# Patient Record
Sex: FEMALE | Race: OTHER | HISPANIC OR LATINO | URBAN - METROPOLITAN AREA
[De-identification: names, ages, dates, MRNs, and addresses within clinical notes are randomized per-mention and may not be internally consistent; named-entity substitution may affect disease eponyms.]

---

## 2022-11-28 ENCOUNTER — INPATIENT (INPATIENT)
Facility: HOSPITAL | Age: 37
LOS: 2 days | Discharge: HOME | End: 2022-12-01
Attending: OBSTETRICS & GYNECOLOGY | Admitting: OBSTETRICS & GYNECOLOGY
Payer: COMMERCIAL

## 2022-11-28 VITALS — HEART RATE: 118 BPM | SYSTOLIC BLOOD PRESSURE: 137 MMHG | DIASTOLIC BLOOD PRESSURE: 80 MMHG

## 2022-11-28 DIAGNOSIS — Z90.49 ACQUIRED ABSENCE OF OTHER SPECIFIED PARTS OF DIGESTIVE TRACT: Chronic | ICD-10-CM

## 2022-11-28 DIAGNOSIS — O26.893 OTHER SPECIFIED PREGNANCY RELATED CONDITIONS, THIRD TRIMESTER: ICD-10-CM

## 2022-11-28 DIAGNOSIS — O47.1 FALSE LABOR AT OR AFTER 37 COMPLETED WEEKS OF GESTATION: ICD-10-CM

## 2022-11-28 LAB
BASOPHILS # BLD AUTO: 0.05 K/UL — SIGNIFICANT CHANGE UP (ref 0–0.2)
BASOPHILS NFR BLD AUTO: 0.4 % — SIGNIFICANT CHANGE UP (ref 0–2)
BLD GP AB SCN SERPL QL: SIGNIFICANT CHANGE UP
COVID-19 SPIKE DOMAIN AB INTERP: POSITIVE
COVID-19 SPIKE DOMAIN ANTIBODY RESULT: >250 U/ML — HIGH
EOSINOPHIL # BLD AUTO: 0.1 K/UL — SIGNIFICANT CHANGE UP (ref 0–0.5)
EOSINOPHIL NFR BLD AUTO: 0.8 % — SIGNIFICANT CHANGE UP (ref 0–6)
HCT VFR BLD CALC: 36 % — SIGNIFICANT CHANGE UP (ref 34.5–45)
HGB BLD-MCNC: 12.3 G/DL — SIGNIFICANT CHANGE UP (ref 11.5–15.5)
HIV 1 & 2 AB SERPL IA.RAPID: SIGNIFICANT CHANGE UP
HIV 1+2 AB+HIV1 P24 AG SERPL QL IA: SIGNIFICANT CHANGE UP
IMM GRANULOCYTES NFR BLD AUTO: 0.5 % — SIGNIFICANT CHANGE UP (ref 0–0.9)
LYMPHOCYTES # BLD AUTO: 0.79 K/UL — LOW (ref 1–3.3)
LYMPHOCYTES # BLD AUTO: 6.5 % — LOW (ref 13–44)
MCHC RBC-ENTMCNC: 31 PG — SIGNIFICANT CHANGE UP (ref 27–34)
MCHC RBC-ENTMCNC: 34.2 GM/DL — SIGNIFICANT CHANGE UP (ref 32–36)
MCV RBC AUTO: 90.7 FL — SIGNIFICANT CHANGE UP (ref 80–100)
MONOCYTES # BLD AUTO: 0.95 K/UL — HIGH (ref 0–0.9)
MONOCYTES NFR BLD AUTO: 7.9 % — SIGNIFICANT CHANGE UP (ref 2–14)
NEUTROPHILS # BLD AUTO: 10.15 K/UL — HIGH (ref 1.8–7.4)
NEUTROPHILS NFR BLD AUTO: 83.9 % — HIGH (ref 43–77)
PLATELET # BLD AUTO: 121 K/UL — LOW (ref 150–400)
RAPID RVP RESULT: DETECTED
RBC # BLD: 3.97 M/UL — SIGNIFICANT CHANGE UP (ref 3.8–5.2)
RBC # FLD: 12.9 % — SIGNIFICANT CHANGE UP (ref 10.3–14.5)
SARS-COV-2 IGG+IGM SERPL QL IA: >250 U/ML — HIGH
SARS-COV-2 IGG+IGM SERPL QL IA: POSITIVE
SARS-COV-2 RNA SPEC QL NAA+PROBE: DETECTED
T PALLIDUM AB TITR SER: NEGATIVE — SIGNIFICANT CHANGE UP
WBC # BLD: 12.1 K/UL — HIGH (ref 3.8–10.5)
WBC # FLD AUTO: 12.1 K/UL — HIGH (ref 3.8–10.5)

## 2022-11-28 PROCEDURE — 88307 TISSUE EXAM BY PATHOLOGIST: CPT | Mod: 26

## 2022-11-28 RX ORDER — AMPICILLIN TRIHYDRATE 250 MG
2 CAPSULE ORAL ONCE
Refills: 0 | Status: COMPLETED | OUTPATIENT
Start: 2022-11-28 | End: 2022-11-28

## 2022-11-28 RX ORDER — SODIUM CHLORIDE 9 MG/ML
1000 INJECTION, SOLUTION INTRAVENOUS
Refills: 0 | Status: DISCONTINUED | OUTPATIENT
Start: 2022-11-28 | End: 2022-11-29

## 2022-11-28 RX ORDER — GENTAMICIN SULFATE 40 MG/ML
300 VIAL (ML) INJECTION ONCE
Refills: 0 | Status: COMPLETED | OUTPATIENT
Start: 2022-11-28 | End: 2022-11-28

## 2022-11-28 RX ORDER — CHLORHEXIDINE GLUCONATE 213 G/1000ML
1 SOLUTION TOPICAL ONCE
Refills: 0 | Status: DISCONTINUED | OUTPATIENT
Start: 2022-11-28 | End: 2022-11-29

## 2022-11-28 RX ORDER — SODIUM CHLORIDE 9 MG/ML
1000 INJECTION, SOLUTION INTRAVENOUS ONCE
Refills: 0 | Status: DISCONTINUED | OUTPATIENT
Start: 2022-11-28 | End: 2022-11-29

## 2022-11-28 RX ORDER — AMPICILLIN TRIHYDRATE 250 MG
CAPSULE ORAL
Refills: 0 | Status: DISCONTINUED | OUTPATIENT
Start: 2022-11-28 | End: 2022-11-29

## 2022-11-28 RX ORDER — OXYTOCIN 10 UNIT/ML
333.33 VIAL (ML) INJECTION
Qty: 20 | Refills: 0 | Status: DISCONTINUED | OUTPATIENT
Start: 2022-11-28 | End: 2022-12-01

## 2022-11-28 RX ORDER — AMPICILLIN TRIHYDRATE 250 MG
2 CAPSULE ORAL EVERY 6 HOURS
Refills: 0 | Status: DISCONTINUED | OUTPATIENT
Start: 2022-11-29 | End: 2022-11-29

## 2022-11-28 RX ORDER — CITRIC ACID/SODIUM CITRATE 300-500 MG
30 SOLUTION, ORAL ORAL ONCE
Refills: 0 | Status: DISCONTINUED | OUTPATIENT
Start: 2022-11-28 | End: 2022-11-29

## 2022-11-28 RX ORDER — ACETAMINOPHEN 500 MG
1000 TABLET ORAL ONCE
Refills: 0 | Status: COMPLETED | OUTPATIENT
Start: 2022-11-28 | End: 2022-11-28

## 2022-11-28 RX ORDER — OXYTOCIN 10 UNIT/ML
2 VIAL (ML) INJECTION
Qty: 30 | Refills: 0 | Status: DISCONTINUED | OUTPATIENT
Start: 2022-11-28 | End: 2022-12-01

## 2022-11-28 RX ORDER — ONDANSETRON 8 MG/1
4 TABLET, FILM COATED ORAL ONCE
Refills: 0 | Status: COMPLETED | OUTPATIENT
Start: 2022-11-28 | End: 2022-11-28

## 2022-11-28 RX ADMIN — SODIUM CHLORIDE 125 MILLILITER(S): 9 INJECTION, SOLUTION INTRAVENOUS at 09:24

## 2022-11-28 RX ADMIN — ONDANSETRON 4 MILLIGRAM(S): 8 TABLET, FILM COATED ORAL at 23:51

## 2022-11-28 RX ADMIN — SODIUM CHLORIDE 125 MILLILITER(S): 9 INJECTION, SOLUTION INTRAVENOUS at 23:13

## 2022-11-28 RX ADMIN — Medication 200 MILLIGRAM(S): at 18:50

## 2022-11-28 RX ADMIN — Medication 400 MILLIGRAM(S): at 17:52

## 2022-11-28 RX ADMIN — Medication 200 GRAM(S): at 18:17

## 2022-11-28 RX ADMIN — Medication 2 MILLIUNIT(S)/MIN: at 10:53

## 2022-11-28 NOTE — OB PROVIDER H&P - ATTENDING COMMENTS
37y  [id number] GA by LMP who presents to L&D for spontaneous ruptured membranes at 0545 with contractions, admitted for augmentation as needed, plan epidural. Consent for care signed through  after questions answered.

## 2022-11-28 NOTE — OB RN DELIVERY SUMMARY - NSSELHIDDEN_OBGYN_ALL_OB_FT
[NS_[email or web address]_ALL_OB_FT:[email or web address]=],[NS_DeliveryAssist1_OBGYN_ALL_OB_FT:[id number]=],[NS_DeliveryRN_OBGYN_ALL_OB_FT:[id number]]

## 2022-11-28 NOTE — OB PROVIDER H&P - NSLASTDATEVISIT_OBGYN_ALL_OB
Known Have Your Spot(S) Been Treated In The Past?: has not been treated Hpi Title: Evaluation of Skin Lesions

## 2022-11-28 NOTE — OB PROVIDER H&P - HISTORY OF PRESENT ILLNESS
37y  [id number] GA by LMP who presents to L&D for spontaneous ruptured membranes at 0545 with contractions q7m since. Patient denies vaginal bleeding. She endorses good fetal movement. Denies fevers, chills, nausea, vomiting, chest pain, SOB, dizziness and headache. No other complaints at this time.   TOM: 22  LMP: 3/8/22  Prenatal course is significant for: limited prenatal care starting in 3rd trimester    POB: SAB   PGYN: -fibroids, -ovarian cysts, denies STD hx, denies abnormal PAPs   PMH: Denies  PSH: D&C , appendectomy  SH: Denies EtOH, tobacco and illicit drug use during this pregnancy; feels safe at home   Meds: PNVs  Allergies: NKDA

## 2022-11-28 NOTE — OB RN PATIENT PROFILE - FALL HARM RISK - UNIVERSAL INTERVENTIONS
Bed in lowest position, wheels locked, appropriate side rails in place/Call bell, personal items and telephone in reach/Instruct patient to call for assistance before getting out of bed or chair/Non-slip footwear when patient is out of bed/[place] to call system/Physically safe environment - no spills, clutter or unnecessary equipment/Purposeful Proactive Rounding/Room/bathroom lighting operational, light cord in reach

## 2022-11-28 NOTE — OB PROVIDER LABOR PROGRESS NOTE - ASSESSMENT
FHT Cat 2, FH improved with maternal repositioning. Pitocin off LR bolus.  C/w amp/gent/ofirmev  Continue expt until patient feels constant pressure

## 2022-11-28 NOTE — CHART NOTE - NSCHARTNOTEFT_GEN_A_CORE
5 minute deceleration s/p epidural. /59. Responded to repositioning. Pitocin off. Fluid bolus being started.

## 2022-11-28 NOTE — OB PROVIDER H&P - ASSESSMENT
37y  [id number] GA by LMP who presents to L&D for spontaneous ruptured membranes at 0545 with contractions q7m since.    -Admit to L&D  -Consent  -Admission labs  -NPO, except ice chips   -IV fluids  -Fetus: Cat I tracing. Continuous toco and fetal monitoring.   -GBS: Negative, no GBS ppx required   -Analgesia: epidural prn  -Pitocin induction    Discussed with Dr. Nixon

## 2022-11-28 NOTE — OB PROVIDER LABOR PROGRESS NOTE - ASSESSMENT
FHT Cat 1  Maternal temp to 101.6 F orally, FH baseline 150-160s. Maternal temp likely secondary to symptomatic COVID. Ofirmev given  Continue pitocin     FHT Cat 1  Maternal temp to 101.6 F orally, FH baseline 150-160s. Amp/gent/ofirmev given  Continue pitocin

## 2022-11-28 NOTE — OB RN DELIVERY SUMMARY - NS_SEPSISRSKCALC_OBGYN_ALL_OB_FT
EOS calculated successfully. EOS Risk Factor: 0.5/1000 live births ([facility] national incidence); GA=38w5d; Temp=101.48; ROM=19.6; GBS='Negative'; Antibiotics='Broad spectrum antibiotics > 4 hrs prior to birth'

## 2022-11-28 NOTE — OB PROVIDER H&P - NSHPPHYSICALEXAM_GEN_ALL_CORE
T(C): 37.1 (11-28-22 @ 07:50), Max: 37.1 (11-28-22 @ 07:26)  HR: 103 (11-28-22 @ 08:53) (100 - 132)  BP: 137/80 (11-28-22 @ 07:50) (137/80 - 137/80)  RR: 16 (11-28-22 @ 07:50) (16 - 16)  SpO2: 100% (11-28-22 @ 08:48) (100% - 100%)    Gen: NAD, well-appearing, AAOx3   Abd: Soft, gravid  Ext: non-tender, non-edematous  SSE: grossly rupture, pooling, clear, nitrazine +   SVE:  2/90/-2  Bedside sono: cephalic, posterior placenta, ALEXA 11  FHT: 155bpm baseline, moderate variability, + accels, no decels  Seaview: q6-8m

## 2022-11-28 NOTE — OB PROVIDER LABOR PROGRESS NOTE - NS_OBIHIFHRDETAILS_OBGYN_ALL_OB_FT
baseline 160s, moderate variability, +accels, -decels
baseline 150, moderate variability, -accels, -decels

## 2022-11-28 NOTE — OB RN PATIENT PROFILE - TERM DELIVERIES, OB PROFILE
[facility]  [address]  [facility] [address]  Phone:  [phone]  Fax:  [phone]                                    Iva Bowman   MRN: [id number]    Department:  [facility]   Date of Visit:  2/5/2020           After Visit Summary Signature Page    I have received my discharge instructions, and my questions have been answered. I have discussed any challenges I see with this plan with the nurse or doctor.    ..........................................................................................................................................  Patient/Patient Representative Signature      ..........................................................................................................................................  Patient Representative Print Name and Relationship to Patient    ..................................................               ................................................  Date                                   Time    ..........................................................................................................................................  Reviewed by Signature/Title    ...................................................              ..............................................  Date                                               Time          22EPIC Rev 08/18       
0

## 2022-11-29 RX ORDER — OXYCODONE HYDROCHLORIDE 5 MG/1
5 TABLET ORAL
Refills: 0 | Status: DISCONTINUED | OUTPATIENT
Start: 2022-11-29 | End: 2022-12-01

## 2022-11-29 RX ORDER — ACETAMINOPHEN 500 MG
975 TABLET ORAL EVERY 6 HOURS
Refills: 0 | Status: COMPLETED | OUTPATIENT
Start: 2022-11-29 | End: 2023-10-28

## 2022-11-29 RX ORDER — IBUPROFEN 200 MG
600 TABLET ORAL EVERY 6 HOURS
Refills: 0 | Status: COMPLETED | OUTPATIENT
Start: 2022-11-29 | End: 2023-10-28

## 2022-11-29 RX ORDER — TETANUS TOXOID, REDUCED DIPHTHERIA TOXOID AND ACELLULAR PERTUSSIS VACCINE, ADSORBED 5; 2.5; 8; 8; 2.5 [IU]/.5ML; [IU]/.5ML; UG/.5ML; UG/.5ML; UG/.5ML
0.5 SUSPENSION INTRAMUSCULAR ONCE
Refills: 0 | Status: DISCONTINUED | OUTPATIENT
Start: 2022-11-29 | End: 2022-12-01

## 2022-11-29 RX ORDER — ACETAMINOPHEN 500 MG
975 TABLET ORAL EVERY 6 HOURS
Refills: 0 | Status: DISCONTINUED | OUTPATIENT
Start: 2022-11-29 | End: 2022-12-01

## 2022-11-29 RX ORDER — LANOLIN
1 OINTMENT (GRAM) TOPICAL EVERY 6 HOURS
Refills: 0 | Status: DISCONTINUED | OUTPATIENT
Start: 2022-11-29 | End: 2022-12-01

## 2022-11-29 RX ORDER — BENZOCAINE 10 %
1 GEL (GRAM) MUCOUS MEMBRANE EVERY 6 HOURS
Refills: 0 | Status: DISCONTINUED | OUTPATIENT
Start: 2022-11-29 | End: 2022-12-01

## 2022-11-29 RX ORDER — PRAMOXINE HYDROCHLORIDE 150 MG/15G
1 AEROSOL, FOAM RECTAL EVERY 4 HOURS
Refills: 0 | Status: DISCONTINUED | OUTPATIENT
Start: 2022-11-29 | End: 2022-12-01

## 2022-11-29 RX ORDER — SODIUM CHLORIDE 9 MG/ML
3 INJECTION INTRAMUSCULAR; INTRAVENOUS; SUBCUTANEOUS EVERY 8 HOURS
Refills: 0 | Status: DISCONTINUED | OUTPATIENT
Start: 2022-11-29 | End: 2022-12-01

## 2022-11-29 RX ORDER — KETOROLAC TROMETHAMINE 30 MG/ML
30 SYRINGE (ML) INJECTION ONCE
Refills: 0 | Status: DISCONTINUED | OUTPATIENT
Start: 2022-11-29 | End: 2022-11-29

## 2022-11-29 RX ORDER — OXYTOCIN 10 UNIT/ML
41.67 VIAL (ML) INJECTION
Qty: 20 | Refills: 0 | Status: DISCONTINUED | OUTPATIENT
Start: 2022-11-29 | End: 2022-12-01

## 2022-11-29 RX ORDER — IBUPROFEN 200 MG
600 TABLET ORAL EVERY 6 HOURS
Refills: 0 | Status: DISCONTINUED | OUTPATIENT
Start: 2022-11-29 | End: 2022-12-01

## 2022-11-29 RX ORDER — OXYCODONE HYDROCHLORIDE 5 MG/1
5 TABLET ORAL ONCE
Refills: 0 | Status: DISCONTINUED | OUTPATIENT
Start: 2022-11-29 | End: 2022-12-01

## 2022-11-29 RX ORDER — HYDROCORTISONE 1 %
1 OINTMENT (GRAM) TOPICAL EVERY 6 HOURS
Refills: 0 | Status: DISCONTINUED | OUTPATIENT
Start: 2022-11-29 | End: 2022-12-01

## 2022-11-29 RX ORDER — SIMETHICONE 80 MG/1
80 TABLET, CHEWABLE ORAL EVERY 4 HOURS
Refills: 0 | Status: DISCONTINUED | OUTPATIENT
Start: 2022-11-29 | End: 2022-12-01

## 2022-11-29 RX ORDER — DIBUCAINE 1 %
1 OINTMENT (GRAM) RECTAL EVERY 6 HOURS
Refills: 0 | Status: DISCONTINUED | OUTPATIENT
Start: 2022-11-29 | End: 2022-12-01

## 2022-11-29 RX ORDER — ACETAMINOPHEN 500 MG
1000 TABLET ORAL ONCE
Refills: 0 | Status: DISCONTINUED | OUTPATIENT
Start: 2022-11-29 | End: 2022-12-01

## 2022-11-29 RX ORDER — DIPHENHYDRAMINE HCL 50 MG
25 CAPSULE ORAL EVERY 6 HOURS
Refills: 0 | Status: DISCONTINUED | OUTPATIENT
Start: 2022-11-29 | End: 2022-12-01

## 2022-11-29 RX ORDER — MAGNESIUM HYDROXIDE 400 MG/1
30 TABLET, CHEWABLE ORAL
Refills: 0 | Status: DISCONTINUED | OUTPATIENT
Start: 2022-11-29 | End: 2022-12-01

## 2022-11-29 RX ORDER — AER TRAVELER 0.5 G/1
1 SOLUTION RECTAL; TOPICAL EVERY 4 HOURS
Refills: 0 | Status: DISCONTINUED | OUTPATIENT
Start: 2022-11-29 | End: 2022-12-01

## 2022-11-29 RX ADMIN — Medication 200 GRAM(S): at 00:07

## 2022-11-29 RX ADMIN — Medication 30 MILLIGRAM(S): at 02:25

## 2022-11-29 RX ADMIN — Medication 600 MILLIGRAM(S): at 21:05

## 2022-11-29 RX ADMIN — Medication 975 MILLIGRAM(S): at 12:16

## 2022-11-29 RX ADMIN — SODIUM CHLORIDE 3 MILLILITER(S): 9 INJECTION INTRAMUSCULAR; INTRAVENOUS; SUBCUTANEOUS at 05:22

## 2022-11-29 RX ADMIN — Medication 600 MILLIGRAM(S): at 14:44

## 2022-11-29 RX ADMIN — Medication 600 MILLIGRAM(S): at 09:24

## 2022-11-29 RX ADMIN — SODIUM CHLORIDE 3 MILLILITER(S): 9 INJECTION INTRAMUSCULAR; INTRAVENOUS; SUBCUTANEOUS at 14:00

## 2022-11-29 RX ADMIN — Medication 975 MILLIGRAM(S): at 17:08

## 2022-11-29 RX ADMIN — Medication 975 MILLIGRAM(S): at 05:13

## 2022-11-29 RX ADMIN — Medication 1 TABLET(S): at 12:16

## 2022-11-29 NOTE — OB PROVIDER DELIVERY SUMMARY - NSPROVIDERDELIVERYNOTE_OBGYN_ALL_OB_FT
Vaginal Delivery Summary    Procedure: Normal spontaneous vaginal delivery   Findings: Viable female infant delivered in cephalic presentation at 0106, placenta delivered at 0113.  Apgar scores: 9/9.   Weight: 2850  Laceration(s): 1st degree perineal and right labial   Repair: chromic   EBL: 112  Complications: No complications     Procedure:   Patient felt rectal pressure and was found to be fully dilated, +1 station. She pushed effectively for 30 minutes. She delivered a viable female infant. Delayed cord clamping was performed for 30 seconds. Placenta delivered intact and pitocin was started at the delivery of the placenta. Perineum and vagina were inspected, a 1st degree and labial laceration present and repaired. Adequate hemostasis was obtained. Vaginal Delivery Summary    Procedure: Normal spontaneous vaginal delivery   Findings: Viable female infant delivered in cephalic presentation at 0106, placenta delivered at 0113.  Apgar scores: 9/9.   Weight: 2850  Laceration(s): 1st degree perineal and right labial   Repair: 2-0 chromic   EBL: 112  Complications: No complications     Procedure:   Patient felt rectal pressure and was found to be fully dilated, +2 station. She pushed effectively for 30 minutes. She delivered a viable female infant. Delayed cord clamping was performed for 30 seconds. Infant handed to nurse as patient with active COVID. Placenta delivered intact and pitocin was started at the delivery of the placenta. Perineum and vagina were inspected, a 1st degree and labial laceration present and repaired. Adequate hemostasis was obtained.    Attending addendum:   I was present for delivery and agree with assessment and plan.   Brianna Little MD

## 2022-11-29 NOTE — DISCHARGE NOTE OB - MEDICATION SUMMARY - MEDICATIONS TO TAKE
I will START or STAY ON the medications listed below when I get home from the hospital:    acetaminophen 500 mg oral tablet  -- 2 tab(s) by mouth every 6 hours   -- This product contains acetaminophen.  Do not use  with any other product containing acetaminophen to prevent possible liver damage.    -- Indication: For pain    ibuprofen 600 mg oral tablet  -- 1 tab(s) by mouth every 6 hours   -- Do not take this drug if you are pregnant.  It is very important that you take or use this exactly as directed.  Do not skip doses or discontinue unless directed by your doctor.  May cause drowsiness or dizziness.  Obtain medical advice before taking any non-prescription drugs as some may affect the action of this medication.  Take with food or milk.    -- Indication: For pain    Prenatal Multivitamins with Folic Acid 1 mg oral tablet  -- 1 tab(s) by mouth once a day  -- Indication: For postpartum

## 2022-11-29 NOTE — DISCHARGE NOTE OB - PATIENT PORTAL LINK FT
You can access the FollowMyHealth Patient Portal offered by [facility] by registering at the following website: [email or web address]. By joining [facility]’s FollowMyHealth portal, you will also be able to view your health information using other applications (apps) compatible with our system.

## 2022-11-29 NOTE — DISCHARGE NOTE OB - MEDICATION SUMMARY - MEDICATIONS TO CHANGE
I will SWITCH the dose or number of times a day I take the medications listed below when I get home from the hospital:  None Dutasteride Male Counseling: Dustasteride Counseling:  I discussed with the patient the risks of use of dutasteride including but not limited to decreased libido, decreased ejaculate volume, and gynecomastia. Women who can become pregnant should not handle medication.  All of the patient's questions and concerns were addressed. Dutasteride Counseling: Dustasteride Counseling:  I discussed with the patient the risks of use of dutasteride including but not limited to decreased libido, decreased ejaculate volume, and gynecomastia. Women who can become pregnant should not handle medication.  All of the patient's questions and concerns were addressed.

## 2022-11-29 NOTE — DISCHARGE NOTE OB - HOSPITAL COURSE
Normal spontaneous vaginal delivery at 38w5d secondary to labor complicated by elevated temp, COVID vs chorio s/p ampicillin, gentamycin, and ofirmev. Postpartum pain is well controlled with PRN medication. She has no difficulty with ambulation, voiding or PO intake. Lab values and vital signs are within normal limits prior to discharge.

## 2022-11-29 NOTE — OB PROVIDER DELIVERY SUMMARY - NSSELHIDDEN_OBGYN_ALL_OB_FT
[NS_DeliveryAttending1_OBGYN_ALL_OB_FT:[email or web address]=],[NS_DeliveryAssist1_OBGYN_ALL_OB_FT:[id number]=]

## 2022-11-29 NOTE — DISCHARGE NOTE OB - CARE PROVIDER_API CALL
Roni Car)  Obstetrics and Gynecology  [address] [place] [address]  Phone: [phone]  Fax: [phone]  Follow Up Time: 2 weeks

## 2022-11-30 LAB
HCT VFR BLD CALC: 30.2 % — LOW (ref 34.5–45)
HGB BLD-MCNC: 10 G/DL — LOW (ref 11.5–15.5)

## 2022-11-30 RX ORDER — IBUPROFEN 200 MG
1 TABLET ORAL
Qty: 28 | Refills: 0
Start: 2022-11-30 | End: 2022-12-06

## 2022-11-30 RX ORDER — ACETAMINOPHEN 500 MG
2 TABLET ORAL
Qty: 56 | Refills: 0
Start: 2022-11-30 | End: 2022-12-06

## 2022-11-30 RX ADMIN — Medication 975 MILLIGRAM(S): at 17:52

## 2022-11-30 RX ADMIN — Medication 600 MILLIGRAM(S): at 08:35

## 2022-11-30 RX ADMIN — Medication 600 MILLIGRAM(S): at 15:02

## 2022-11-30 RX ADMIN — Medication 600 MILLIGRAM(S): at 22:05

## 2022-11-30 RX ADMIN — Medication 975 MILLIGRAM(S): at 00:32

## 2022-11-30 RX ADMIN — Medication 1 TABLET(S): at 12:30

## 2022-11-30 RX ADMIN — Medication 975 MILLIGRAM(S): at 12:31

## 2022-11-30 RX ADMIN — Medication 975 MILLIGRAM(S): at 05:06

## 2022-11-30 NOTE — PROGRESS NOTE ADULT - ATTENDING COMMENTS
PPD#1  Doing well  COVID + symptomatic  Hgb 12.3 -->10  routine PP care  continue current mgmt  ppalos

## 2022-11-30 NOTE — PROGRESS NOTE ADULT - ASSESSMENT
A/P:  JONNIE MAKI is a 37y  now PPD#1 s/p spontaneous vaginal delivery at 38w5d gestation secondary to labor, complicated by a temperature of 101 likely secondary to COVID vs, chorioamnionitis s/p ampicillin and gentamycin. COVID positive, symptomatic   -Vital signs stable  -Hgb: 12.3 -> AM labs pending   -Voiding, tolerating PO  -Advance care as tolerated   -Continue routine postpartum care and education  -Healthy female infant  -Dispo: Continue with inpatient management and plan for discharge home tomorrow pending attending approval

## 2022-12-01 VITALS
OXYGEN SATURATION: 95 % | HEART RATE: 75 BPM | SYSTOLIC BLOOD PRESSURE: 139 MMHG | TEMPERATURE: 98 F | DIASTOLIC BLOOD PRESSURE: 85 MMHG | RESPIRATION RATE: 18 BRPM

## 2022-12-01 PROCEDURE — 86901 BLOOD TYPING SEROLOGIC RH(D): CPT

## 2022-12-01 PROCEDURE — 59050 FETAL MONITOR W/REPORT: CPT

## 2022-12-01 PROCEDURE — 36415 COLL VENOUS BLD VENIPUNCTURE: CPT

## 2022-12-01 PROCEDURE — G0463: CPT

## 2022-12-01 PROCEDURE — 85025 COMPLETE CBC W/AUTO DIFF WBC: CPT

## 2022-12-01 PROCEDURE — 85018 HEMOGLOBIN: CPT

## 2022-12-01 PROCEDURE — 86769 SARS-COV-2 COVID-19 ANTIBODY: CPT

## 2022-12-01 PROCEDURE — 0225U NFCT DS DNA&RNA 21 SARSCOV2: CPT

## 2022-12-01 PROCEDURE — 88307 TISSUE EXAM BY PATHOLOGIST: CPT

## 2022-12-01 PROCEDURE — 86900 BLOOD TYPING SEROLOGIC ABO: CPT

## 2022-12-01 PROCEDURE — 85014 HEMATOCRIT: CPT

## 2022-12-01 PROCEDURE — 87389 HIV-1 AG W/HIV-1&-2 AB AG IA: CPT

## 2022-12-01 PROCEDURE — 59025 FETAL NON-STRESS TEST: CPT

## 2022-12-01 PROCEDURE — 86780 TREPONEMA PALLIDUM: CPT

## 2022-12-01 PROCEDURE — 86703 HIV-1/HIV-2 1 RESULT ANTBDY: CPT

## 2022-12-01 PROCEDURE — 86850 RBC ANTIBODY SCREEN: CPT

## 2022-12-01 RX ADMIN — Medication 600 MILLIGRAM(S): at 08:47

## 2022-12-01 RX ADMIN — Medication 975 MILLIGRAM(S): at 05:18

## 2022-12-01 NOTE — PROGRESS NOTE ADULT - ATTENDING COMMENTS
37y  now PPD#2  no complaints  COVID+, pt currently asymptomatic  denies VB, denies SOB, cough, dizziness  breastfeeding   pt has been afebrile x 48hrs  Abd soft, uterus firm, NT  ext no edema   plan   DC home today   PP care dicsussed   COVID precautions given   F/up in clinic 6 weeks

## 2022-12-01 NOTE — PROGRESS NOTE ADULT - ASSESSMENT
A/P:  JONNIE MAKI is a 37y  now PPD#1 s/p spontaneous vaginal delivery at 38w5d gestation secondary to labor, complicated by a temperature of 101 likely secondary to COVID vs, chorioamnionitis s/p ampicillin and gentamycin. COVID positive, symptomatic   -Vital signs stable  -Hgb: 12.3 -> 10.0   -Voiding, tolerating PO  -Advance care as tolerated   -Continue routine postpartum care and education  -Healthy female infant  -Dispo: Plan for discharge home today pending attending approval

## 2022-12-01 NOTE — PROGRESS NOTE ADULT - SUBJECTIVE AND OBJECTIVE BOX
INTERVAL HPI/OVERNIGHT EVENTS:  37y Female s/p labor epidural, dural puncture on 11/28/2022    Vital Signs Last 24 Hrs  T(C): 36.6 (29 Nov 2022 08:31), Max: 38.6 (28 Nov 2022 17:19)  T(F): 97.8 (29 Nov 2022 08:31), Max: 101.48 (28 Nov 2022 17:19)  HR: 78 (29 Nov 2022 08:31) (71 - 130)  BP: 130/67 (29 Nov 2022 08:31) (101/56 - 157/82)  BP(mean): --  RR: 16 (29 Nov 2022 08:31) (16 - 20)  SpO2: 100% (29 Nov 2022 08:31) (87% - 100%)    Parameters below as of 29 Nov 2022 08:31  Patient On (Oxygen Delivery Method): room air            Patient's overall anesthesia satisfaction: Positive    Patient seen and doing well     No headache      No residual numbness or weakness, sensory and motor function intact    Site examined Clean and Dry    No anesthetic complications or complaints noted or reported                 
JONNIE MAKI is a 37y  now PPD#2 s/p spontaneous vaginal delivery at 38w5d gestation secondary to labor, complicated by a temperature of 101 likely secondary to COVID vs, chorioamnionitis s/p ampicillin and gentamycin. COVID positive, symptomatic     S:    No acute events overnight. Afebrile overnight   The patient has no complaints.  Pain controlled with current treatment regimen.   She is ambulating without difficulty and tolerating PO.   + flatus/-BM/+ voiding   She endorses appropriate lochia, which is decreasing.   She is breastfeeding without difficulty.   She denies fevers, chills, nausea and vomiting.   She denies lightheadedness, dizziness, palpitations, chest pain and SOB.     O:    Vital Signs Last 24 Hrs  T(C): 36.8 (2022 19:41), Max: 36.9 (2022 15:24)  T(F): 98.2 (2022 19:41), Max: 98.4 (2022 15:24)  HR: 78 (2022 19:41) (70 - 78)  BP: 130/83 (2022 19:41) (118/63 - 130/83)  RR: 18 (2022 19:41) (18 - 18)  SpO2: 98% (2022 19:41) (98% - 98%)    Gen: NAD, AOx3, resting comfortably on room air   Abdomen:  Soft, non-tender, non-distended  Uterus:  Fundus firm below umbilicus  VE:  Expected lochia  Ext:  b/l LE non-tender                                      10.0   x     )-----------( x        ( 2022 06:38 )             30.2             
JONNIE MAKI is a 37y  now PPD#1 s/p spontaneous vaginal delivery at 38w5d gestation secondary to labor, complicated by a temperature of 101 likely secondary to COVID vs, chorioamnionitis s/p ampicillin and gentamycin. COVID positive, symptomatic     S:    No acute events overnight. Afebrile overnight   The patient has no complaints.  Pain controlled with current treatment regimen.   She is ambulating without difficulty and tolerating PO.   + flatus/-BM/+ voiding   She endorses appropriate lochia, which is decreasing.   She is breastfeeding without difficulty.   She denies fevers, chills, nausea and vomiting.   She denies lightheadedness, dizziness, palpitations, chest pain and SOB.     O:    T(C): 36.9 (22 @ 15:30), Max: 36.9 (22 @ 15:30)  HR: 77 (22 @ 15:30) (77 - 93)  BP: 133/76 (22 @ 15:30) (130/67 - 133/80)  RR: 18 (22 @ 15:30) (16 - 18)  SpO2: 100% (22 @ 15:30) (99% - 100%)    Gen: NAD, AOx3, resting comfortably on room air   Abdomen:  Soft, non-tender, non-distended  Uterus:  Fundus firm below umbilicus  VE:  Expected lochia  Ext:  b/l LE non-tender                           12.3   12.10 )-----------( 121      ( 2022 09:25 )             36.0

## 2022-12-13 LAB — SURGICAL PATHOLOGY STUDY: SIGNIFICANT CHANGE UP

## 2023-11-13 ENCOUNTER — EMERGENCY (EMERGENCY)
Facility: HOSPITAL | Age: 38
LOS: 1 days | Discharge: HOME | End: 2023-11-13
Attending: EMERGENCY MEDICINE
Payer: MEDICAID

## 2023-11-13 VITALS
TEMPERATURE: 98 F | OXYGEN SATURATION: 100 % | DIASTOLIC BLOOD PRESSURE: 74 MMHG | SYSTOLIC BLOOD PRESSURE: 116 MMHG | HEART RATE: 95 BPM | RESPIRATION RATE: 18 BRPM

## 2023-11-13 VITALS
HEIGHT: 63 IN | RESPIRATION RATE: 20 BRPM | SYSTOLIC BLOOD PRESSURE: 111 MMHG | TEMPERATURE: 98 F | WEIGHT: 130.95 LBS | HEART RATE: 88 BPM | DIASTOLIC BLOOD PRESSURE: 78 MMHG | OXYGEN SATURATION: 98 %

## 2023-11-13 DIAGNOSIS — Z90.49 ACQUIRED ABSENCE OF OTHER SPECIFIED PARTS OF DIGESTIVE TRACT: Chronic | ICD-10-CM

## 2023-11-13 LAB
ALBUMIN SERPL ELPH-MCNC: 4.3 G/DL — SIGNIFICANT CHANGE UP (ref 3.3–5.2)
ALBUMIN SERPL ELPH-MCNC: 4.3 G/DL — SIGNIFICANT CHANGE UP (ref 3.3–5.2)
ALP SERPL-CCNC: 106 U/L — SIGNIFICANT CHANGE UP (ref 40–120)
ALP SERPL-CCNC: 106 U/L — SIGNIFICANT CHANGE UP (ref 40–120)
ALT FLD-CCNC: 42 U/L — HIGH
ALT FLD-CCNC: 42 U/L — HIGH
ANION GAP SERPL CALC-SCNC: 9 MMOL/L — SIGNIFICANT CHANGE UP (ref 5–17)
ANION GAP SERPL CALC-SCNC: 9 MMOL/L — SIGNIFICANT CHANGE UP (ref 5–17)
AST SERPL-CCNC: 77 U/L — HIGH
AST SERPL-CCNC: 77 U/L — HIGH
BASOPHILS # BLD AUTO: 0.03 K/UL — SIGNIFICANT CHANGE UP (ref 0–0.2)
BASOPHILS # BLD AUTO: 0.03 K/UL — SIGNIFICANT CHANGE UP (ref 0–0.2)
BASOPHILS NFR BLD AUTO: 0.3 % — SIGNIFICANT CHANGE UP (ref 0–2)
BASOPHILS NFR BLD AUTO: 0.3 % — SIGNIFICANT CHANGE UP (ref 0–2)
BILIRUB SERPL-MCNC: 0.3 MG/DL — LOW (ref 0.4–2)
BILIRUB SERPL-MCNC: 0.3 MG/DL — LOW (ref 0.4–2)
BUN SERPL-MCNC: 16.2 MG/DL — SIGNIFICANT CHANGE UP (ref 8–20)
BUN SERPL-MCNC: 16.2 MG/DL — SIGNIFICANT CHANGE UP (ref 8–20)
CALCIUM SERPL-MCNC: 9.1 MG/DL — SIGNIFICANT CHANGE UP (ref 8.4–10.5)
CALCIUM SERPL-MCNC: 9.1 MG/DL — SIGNIFICANT CHANGE UP (ref 8.4–10.5)
CHLORIDE SERPL-SCNC: 101 MMOL/L — SIGNIFICANT CHANGE UP (ref 96–108)
CHLORIDE SERPL-SCNC: 101 MMOL/L — SIGNIFICANT CHANGE UP (ref 96–108)
CO2 SERPL-SCNC: 29 MMOL/L — SIGNIFICANT CHANGE UP (ref 22–29)
CO2 SERPL-SCNC: 29 MMOL/L — SIGNIFICANT CHANGE UP (ref 22–29)
CREAT SERPL-MCNC: 0.64 MG/DL — SIGNIFICANT CHANGE UP (ref 0.5–1.3)
CREAT SERPL-MCNC: 0.64 MG/DL — SIGNIFICANT CHANGE UP (ref 0.5–1.3)
EGFR: 115 ML/MIN/1.73M2 — SIGNIFICANT CHANGE UP
EGFR: 115 ML/MIN/1.73M2 — SIGNIFICANT CHANGE UP
EOSINOPHIL # BLD AUTO: 0.05 K/UL — SIGNIFICANT CHANGE UP (ref 0–0.5)
EOSINOPHIL # BLD AUTO: 0.05 K/UL — SIGNIFICANT CHANGE UP (ref 0–0.5)
EOSINOPHIL NFR BLD AUTO: 0.5 % — SIGNIFICANT CHANGE UP (ref 0–6)
EOSINOPHIL NFR BLD AUTO: 0.5 % — SIGNIFICANT CHANGE UP (ref 0–6)
GLUCOSE SERPL-MCNC: 113 MG/DL — HIGH (ref 70–99)
GLUCOSE SERPL-MCNC: 113 MG/DL — HIGH (ref 70–99)
HCG SERPL-ACNC: <4 MIU/ML — SIGNIFICANT CHANGE UP
HCG SERPL-ACNC: <4 MIU/ML — SIGNIFICANT CHANGE UP
HCT VFR BLD CALC: 39.1 % — SIGNIFICANT CHANGE UP (ref 34.5–45)
HCT VFR BLD CALC: 39.1 % — SIGNIFICANT CHANGE UP (ref 34.5–45)
HGB BLD-MCNC: 13 G/DL — SIGNIFICANT CHANGE UP (ref 11.5–15.5)
HGB BLD-MCNC: 13 G/DL — SIGNIFICANT CHANGE UP (ref 11.5–15.5)
IMM GRANULOCYTES NFR BLD AUTO: 0.3 % — SIGNIFICANT CHANGE UP (ref 0–0.9)
IMM GRANULOCYTES NFR BLD AUTO: 0.3 % — SIGNIFICANT CHANGE UP (ref 0–0.9)
LIDOCAIN IGE QN: 31 U/L — SIGNIFICANT CHANGE UP (ref 22–51)
LIDOCAIN IGE QN: 31 U/L — SIGNIFICANT CHANGE UP (ref 22–51)
LYMPHOCYTES # BLD AUTO: 1.1 K/UL — SIGNIFICANT CHANGE UP (ref 1–3.3)
LYMPHOCYTES # BLD AUTO: 1.1 K/UL — SIGNIFICANT CHANGE UP (ref 1–3.3)
LYMPHOCYTES # BLD AUTO: 10.4 % — LOW (ref 13–44)
LYMPHOCYTES # BLD AUTO: 10.4 % — LOW (ref 13–44)
MCHC RBC-ENTMCNC: 28.8 PG — SIGNIFICANT CHANGE UP (ref 27–34)
MCHC RBC-ENTMCNC: 28.8 PG — SIGNIFICANT CHANGE UP (ref 27–34)
MCHC RBC-ENTMCNC: 33.2 GM/DL — SIGNIFICANT CHANGE UP (ref 32–36)
MCHC RBC-ENTMCNC: 33.2 GM/DL — SIGNIFICANT CHANGE UP (ref 32–36)
MCV RBC AUTO: 86.7 FL — SIGNIFICANT CHANGE UP (ref 80–100)
MCV RBC AUTO: 86.7 FL — SIGNIFICANT CHANGE UP (ref 80–100)
MONOCYTES # BLD AUTO: 0.82 K/UL — SIGNIFICANT CHANGE UP (ref 0–0.9)
MONOCYTES # BLD AUTO: 0.82 K/UL — SIGNIFICANT CHANGE UP (ref 0–0.9)
MONOCYTES NFR BLD AUTO: 7.8 % — SIGNIFICANT CHANGE UP (ref 2–14)
MONOCYTES NFR BLD AUTO: 7.8 % — SIGNIFICANT CHANGE UP (ref 2–14)
NEUTROPHILS # BLD AUTO: 8.52 K/UL — HIGH (ref 1.8–7.4)
NEUTROPHILS # BLD AUTO: 8.52 K/UL — HIGH (ref 1.8–7.4)
NEUTROPHILS NFR BLD AUTO: 80.7 % — HIGH (ref 43–77)
NEUTROPHILS NFR BLD AUTO: 80.7 % — HIGH (ref 43–77)
PLATELET # BLD AUTO: 205 K/UL — SIGNIFICANT CHANGE UP (ref 150–400)
PLATELET # BLD AUTO: 205 K/UL — SIGNIFICANT CHANGE UP (ref 150–400)
POTASSIUM SERPL-MCNC: 4.3 MMOL/L — SIGNIFICANT CHANGE UP (ref 3.5–5.3)
POTASSIUM SERPL-MCNC: 4.3 MMOL/L — SIGNIFICANT CHANGE UP (ref 3.5–5.3)
POTASSIUM SERPL-SCNC: 4.3 MMOL/L — SIGNIFICANT CHANGE UP (ref 3.5–5.3)
POTASSIUM SERPL-SCNC: 4.3 MMOL/L — SIGNIFICANT CHANGE UP (ref 3.5–5.3)
PROT SERPL-MCNC: 6.9 G/DL — SIGNIFICANT CHANGE UP (ref 6.6–8.7)
PROT SERPL-MCNC: 6.9 G/DL — SIGNIFICANT CHANGE UP (ref 6.6–8.7)
RBC # BLD: 4.51 M/UL — SIGNIFICANT CHANGE UP (ref 3.8–5.2)
RBC # BLD: 4.51 M/UL — SIGNIFICANT CHANGE UP (ref 3.8–5.2)
RBC # FLD: 12.5 % — SIGNIFICANT CHANGE UP (ref 10.3–14.5)
RBC # FLD: 12.5 % — SIGNIFICANT CHANGE UP (ref 10.3–14.5)
SODIUM SERPL-SCNC: 139 MMOL/L — SIGNIFICANT CHANGE UP (ref 135–145)
SODIUM SERPL-SCNC: 139 MMOL/L — SIGNIFICANT CHANGE UP (ref 135–145)
WBC # BLD: 10.55 K/UL — HIGH (ref 3.8–10.5)
WBC # BLD: 10.55 K/UL — HIGH (ref 3.8–10.5)
WBC # FLD AUTO: 10.55 K/UL — HIGH (ref 3.8–10.5)
WBC # FLD AUTO: 10.55 K/UL — HIGH (ref 3.8–10.5)

## 2023-11-13 PROCEDURE — 76705 ECHO EXAM OF ABDOMEN: CPT

## 2023-11-13 PROCEDURE — 36415 COLL VENOUS BLD VENIPUNCTURE: CPT

## 2023-11-13 PROCEDURE — 85025 COMPLETE CBC W/AUTO DIFF WBC: CPT

## 2023-11-13 PROCEDURE — 99284 EMERGENCY DEPT VISIT MOD MDM: CPT | Mod: 25

## 2023-11-13 PROCEDURE — 96375 TX/PRO/DX INJ NEW DRUG ADDON: CPT

## 2023-11-13 PROCEDURE — 80053 COMPREHEN METABOLIC PANEL: CPT

## 2023-11-13 PROCEDURE — 84702 CHORIONIC GONADOTROPIN TEST: CPT

## 2023-11-13 PROCEDURE — T1013: CPT

## 2023-11-13 PROCEDURE — 76705 ECHO EXAM OF ABDOMEN: CPT | Mod: 26

## 2023-11-13 PROCEDURE — 96374 THER/PROPH/DIAG INJ IV PUSH: CPT

## 2023-11-13 PROCEDURE — 83690 ASSAY OF LIPASE: CPT

## 2023-11-13 PROCEDURE — 99285 EMERGENCY DEPT VISIT HI MDM: CPT

## 2023-11-13 RX ORDER — SODIUM CHLORIDE 9 MG/ML
1000 INJECTION INTRAMUSCULAR; INTRAVENOUS; SUBCUTANEOUS ONCE
Refills: 0 | Status: COMPLETED | OUTPATIENT
Start: 2023-11-13 | End: 2023-11-13

## 2023-11-13 RX ORDER — FAMOTIDINE 10 MG/ML
20 INJECTION INTRAVENOUS ONCE
Refills: 0 | Status: COMPLETED | OUTPATIENT
Start: 2023-11-13 | End: 2023-11-13

## 2023-11-13 RX ORDER — KETOROLAC TROMETHAMINE 30 MG/ML
30 SYRINGE (ML) INJECTION ONCE
Refills: 0 | Status: DISCONTINUED | OUTPATIENT
Start: 2023-11-13 | End: 2023-11-13

## 2023-11-13 RX ORDER — ONDANSETRON 8 MG/1
4 TABLET, FILM COATED ORAL ONCE
Refills: 0 | Status: COMPLETED | OUTPATIENT
Start: 2023-11-13 | End: 2023-11-13

## 2023-11-13 RX ADMIN — Medication 30 MILLIGRAM(S): at 16:49

## 2023-11-13 RX ADMIN — FAMOTIDINE 20 MILLIGRAM(S): 10 INJECTION INTRAVENOUS at 16:49

## 2023-11-13 RX ADMIN — SODIUM CHLORIDE 1000 MILLILITER(S): 9 INJECTION INTRAMUSCULAR; INTRAVENOUS; SUBCUTANEOUS at 16:49

## 2023-11-13 RX ADMIN — ONDANSETRON 4 MILLIGRAM(S): 8 TABLET, FILM COATED ORAL at 16:49

## 2023-11-13 NOTE — ED PROVIDER NOTE - CLINICAL SUMMARY MEDICAL DECISION MAKING FREE TEXT BOX
39 y/o female with PMHx of gallstones, appendectomy presents to the ED c/o abdominal pain that started today after eating lunch which consisted of boiled plantains. Pt recently diagnosed with gallstones on outpatient US 4 days ago, has not yet had opportunity to f/u with a surgeon. Pt will known biliary colic, labs, US, pain meds reassess, pt currently breast feeding, also has not yet had opportunity to f/u with surgeon yet, notes pain it becoming more frequent and worse after eating. 37 y/o female with PMHx of gallstones, appendectomy presents to the ED c/o abdominal pain that started today after eating lunch which consisted of boiled plantains. Pt recently diagnosed with gallstones on outpatient US 4 days ago, has not yet had opportunity to f/u with a surgeon. Pt will known biliary colic, labs, US, pain meds reassess, pt currently breast feeding, also has not yet had opportunity to f/u with surgeon yet, notes pain it becoming more frequent and worse after eating.    RODRIGO Wagner 1575: Known gallstones. Evaluated by surgery. Given option to stay vs. discharge. Patient has child and cannot stay. Advised Augmentin x4 days 2/2 shift and follow up with clinic. Medically stable for discharge.

## 2023-11-13 NOTE — ED PROVIDER NOTE - NSFOLLOWUPINSTRUCTIONS_ED_ALL_ED_FT
- Prescription sent to pharmacy.  - Ibuprofen 600mg every 6 hours as needed for pain.  - Acetaminophen 650mg every 6 hours as needed for pain.  - Avoid diet high in fast.   - Please call tomorrow to schedule follow up appointment with surgery clinic.   - Please bring all documentation from your ED visit to any related future follow up appointment.  - Please call to schedule follow up appointment with your primary care physician within 24-48 hours.  - Please seek immediate medical attention or return to the ED for any new/worsening, signs/symptoms, or concerns including but not limited to fevers, intractable pain/vomiting.     Feel better!     - Receta enviada a farmacia.  - Ibuprofeno 600 mg cada 6 horas según sea necesario para el dolor.  - Acetaminofén 650 mg cada 6 horas según sea necesario para el dolor.  - Evitar dietas ricas en ayuno.  - Llame mañana para programar terry jovanny de seguimiento con la clínica de cirugía.  - Traiga toda la documentación de myles visita al servicio de urgencias a cualquier jovanny de seguimiento futura relacionada.  - Llame para programar terry jovanny de seguimiento con myles médico de atención primaria dentro de las 24 a 48 horas.  - Busque atención médica inmediata o regrese al servicio de urgencias si presenta cualquier signo/síntoma nuevo/empeoramiento o inquietud que incluya, entre otros, fiebre, dolor/vómitos intratables.    ¡Sentirse mejor!    Cólico biliar en los adultos  Biliary Colic, Adult       El cólico biliar es un dolor intenso causado por un problema en la vesícula biliar. La vesícula biliar es un pequeño órgano ubicado en la parte superior derecha del abdomen. La vesícula biliar almacena un líquido digestivo que se produce en el hígado (bilis) y que permite que el cuerpo degrade la grasa. La bilis y otras enzimas digestivas son transportadas desde el hígado hasta el intestino navarro a través de estructuras con forma de tubos llamadas conductos biliares. La vesícula y los conductos biliares tracie las vías biliares.    A veces, en la vesícula biliar se tracie depósitos duros de líquidos digestivos (cálculos biliares), que obstruyen el flujo de bilis desde la vesícula biliar y producen un cólico biliar. Esta afección también se conoce christiano ataque de la vesícula biliar. Los cálculos biliares pueden ser tan pequeños christiano un grano de arena o tan grandes christiano terry pelota de golf. Es posible que haya un solo cálculo biliar en la vesícula o que haya muchos.    ¿Cuáles son las causas?  A menudo la causa de esta afección son los cálculos biliares. Con flavio frecuencia, un tumor puede bloquear el flujo de bilis desde la vesícula y causar un cólico biliar.    ¿Qué incrementa el riesgo?  Los siguientes factores pueden hacer que usted sea más propenso a tener esta afección:    Ser cameron.  Antecedentes familiares de cálculos biliares.  Ser carrie.  Bajar de peso de forma repentina o rápida.  Consumir alimentos con alto contenido de calorías, bajo contenido de fibra y ricos en carbohidratos refinados, christiano el pan saleh y el arroz saleh.  Tener ciertas afecciones, christiano:    Terry enfermedad intestinal que afecta la absorción de nutrientes, christiano la enfermedad de Crohn.  Un trastorno metabólico, christiano el síndrome metabólico o la diabetes. El síndrome metabólico se produce cuando terry persona tiene presión arterial melissa, colesterol alto y diabetes.  Terry afección de la nette, christiano anemia hemolítica o anemia drepanocítica.    ¿Cuáles son los signos o síntomas?  El síntoma principal de esta afección es dolor intenso en el lado superior derecho del abdomen. Es posible que sienta william dolor debajo del pecho maurisio encima de la cadera. William dolor ocurre con frecuencia por la noche o después de comer terry comida con alto contenido de grasa. El dolor puede empeorar por hasta terry hora y durar hasta 12 horas. En la mayoría de los casos, el dolor se desvanece (desaparece) en un par de horas.    Entre otros síntomas de esta afección, se incluyen los siguientes:    Náuseas y vómitos.  Dolor debajo del hombro derecho.    ¿Cómo se diagnostica?  Esta afección se diagnostica en función de los síntomas, los antecedentes médicos y un examen físico.    También pueden hacerle estudios, que incluyen los siguientes:    Análisis de nette para descartar terry infección o inflamación de los conductos biliares, la vesícula biliar, el páncreas o el hígado.  Estudios de diagnóstico por imágenes, por ejemplo:    Terry ecografía.  Terry exploración por tomografía computarizada (TC).  Terry resonancia magnética (RM).    En ciertos casos, es posible que le realicen un estudio de diagnóstico por imágenes con terry pequeña cantidad de material radioactivo (medicina nuclear) para confirmar el diagnóstico.    ¿Cómo se trata?  Esta afección se puede tratar con medicamentos para:    Aliviar el dolor o las náuseas.  Disolver los cálculos biliares. Pueden transcurrir meses o años antes de que desaparezcan totalmente.    Si tiene cálculos biliares o un tumor en la vesícula biliar que le producen cólicos biliares, es posible que necesite terry cirugía para extirpar la vesícula biliar (colecistectomía).    Siga estas instrucciones en myles casa:      Comida y bebida    Dana suficiente líquido christiano para mantener la orina de color amarillo pálido.  Siga las indicaciones del médico respecto de las restricciones en las comidas o las bebidas. Estas pueden incluir evitar:    Alimentos grasos y fritos.  Cualquier comida que intensifique el dolor.  Post en exceso.  Ingerir terry comida abundante después de no bev comido por cierto tiempo.        Instrucciones generales    Use los medicamentos de venta jessica y los recetados solamente christiano se lo haya indicado el médico.  Concurra a todas las visitas de seguimiento christiano se lo haya indicado el médico. [place] es importante.    ¿Cómo se previene?  Los pasos a seguir para evitar esta afección incluyen:    Mantener un peso corporal adecuado.  Practicar actividad física con regularidad.  Seguir terry dieta saludable con alto contenido de fibra y bajo contenido de grasas.  Limitar la cantidad de azúcar y carbohidratos refinados que consume.    Comuníquese con un médico si:  El dolor le dura más de 5 horas.  Vomita.  Siente escalofríos o tiene fiebre.  El dolor empeora.    Solicite ayuda de inmediato si:  Tiene un color amarillo en la piel o en las partes johann del fabiana (ictericia).  Tiene la orina de color té y las heces muy claras.  Se siente mareado o se desmaya.    Resumen  El cólico biliar es un dolor intenso causado por un problema en la vesícula biliar. La vesícula biliar es un pequeño órgano ubicado en la parte superior derecha del abdomen.  El tratamiento para esta afección puede incluir medicamentos para aliviar el dolor o las náuseas, o medicamentos que disuelven lentamente los cálculos biliares.  Si tiene cálculos biliares o un tumor en la vesícula biliar que le producen cólicos biliares, es posible que necesite terry cirugía para extirpar la vesícula biliar (colecistectomía).    NOTAS ADICIONALES E INSTRUCCIONES    Please follow up with your Primary MD in 24-48 hr.  Seek immediate medical care for any new/worsening signs or symptoms.

## 2023-11-13 NOTE — CONSULT NOTE ADULT - ASSESSMENT
A: 39yo F who presents to ED for symptomatic cholelithiasis with no evidence of acute cholecystitis. Symptomatic improvement in ED. Exam benign.     Plan:   - PO challenge   - Recommend PO augmentin  - Follow up with Surgery on this week to discuss interval cholecystectomy     Patient evaluated at bedside with Surgery Attending

## 2023-11-13 NOTE — ED PROVIDER NOTE - NSFOLLOWUPCLINICS_GEN_ALL_ED_FT
[facility] Acute Care Surgery  Acute Care Surgery  [address] [place] [id number]  Phone: [phone]  Fax:

## 2023-11-13 NOTE — ED PROVIDER NOTE - CARE PROVIDER_API CALL
Oksana French [facility]  Surgery  [address] [place] [id number]  Phone: [phone]  Fax: [phone]  Follow Up Time:

## 2023-11-13 NOTE — ED PROVIDER NOTE - OBJECTIVE STATEMENT
39 y/o female with PMHx of gallstones, appendectomy presents to the ED c/o abdominal pain that started today after eating lunch which consisted of boiled plantains. Pt recently diagnosed with gallstones on outpatient US 4 days ago, has not yet had opportunity to f/u with a surgeon. Pt notes pain usually improves after vomiting. Denies f/c/cp/sob/palpitations/cough/rash/headache/dizziness/d/c/dysuria/hematuria. Pt currently breastfeeding, is about 1 year postpartum. no meds PTA.     Interpretation services offered, pt declined and preferred to have to her cousin interpreted

## 2023-11-13 NOTE — CONSULT NOTE ADULT - SUBJECTIVE AND OBJECTIVE BOX
Surgery Consult    Consulting attending: Dr. French       HPI: 37 yo F with a hx of cholelithiasis, diagnosed ~ 3 months ago in [facility], who presents to the ED with complaints of post-prandial epigastric and RUQ pain. Patient states that she has been having intermittent pain since Friday, that progressed. Endorses nausea at the time of colic episodes , with one episode of small volume emesis. She denies any changes in bowel habits, fever/chills, CP and/or SOB. RUQ U/S  demonstrates a contracted gallbbladder with cholelithiasis, CBD  6mm. Patient has a normal white count ( with slight left shift). BIli wnl. Transaminitis with Alk phos/AST/ALT  of 106/77/42, respectively.  Afebrile and HDS.         PAST MEDICAL HISTORY:  Cholelithiasis       PAST SURGICAL HISTORY:  History of appendectomy          MEDICATIONS:  amoxicillin  875 milliGRAM(s)/clavulanate 1 Tablet(s) Oral Once        ALLERGIES:  No Known Allergies        VITALS & I/Os:  Vital Signs Last 24 Hrs  T(C): 36.8 (13 Nov 2023 21:06), Max: 36.8 (13 Nov 2023 21:06)  T(F): 98.3 (13 Nov 2023 21:06), Max: 98.3 (13 Nov 2023 21:06)  HR: 95 (13 Nov 2023 21:06) (88 - 95)  BP: 116/74 (13 Nov 2023 21:06) (111/78 - 116/74)  BP(mean): --  RR: 18 (13 Nov 2023 21:06) (18 - 20)  SpO2: 100% (13 Nov 2023 21:06) (98% - 100%)    Parameters below as of 13 Nov 2023 21:06  Patient On (Oxygen Delivery Method): room air        I&O's Summary        PHYSICAL EXAM:  Constitutional: patient resting comfortably in bed, in no acute distress  Respiratory: respirations are unlabored, no accessory muscle use, no conversational dyspnea  Cardiovascular: regular rate & rhythm  Gastrointestinal: Abdomen soft, non-tender, non-distended, no rebound tenderness / guarding, no murphys sign   Neurological: A&O x 3    LABS:                        13.0   10.55 )-----------( 205      ( 13 Nov 2023 16:20 )             39.1     11-13    139  |  101  |  16.2  ----------------------------<  113<H>  4.3   |  29.0  |  0.64    Ca    9.1      13 Nov 2023 16:20    TPro  6.9  /  Alb  4.3  /  TBili  0.3<L>  /  DBili  x   /  AST  77<H>  /  ALT  42<H>  /  AlkPhos  106  11-13    Lactate:              Urinalysis Basic - ( 13 Nov 2023 16:20 )    Color: x / Appearance: x / SG: x / pH: x  Gluc: 113 mg/dL / Ketone: x  / Bili: x / Urobili: x   Blood: x / Protein: x / Nitrite: x   Leuk Esterase: x / RBC: x / WBC x   Sq Epi: x / Non Sq Epi: x / Bacteria: x          IMAGING:  < from: US Abdomen Upper Quadrant Right (11.13.23 @ 18:18) >  FINDINGS:  Liver: Within normal limits.  Bile ducts: Normal caliber. Common bile duct measures 6 mm.  Gallbladder: Contracted and stone filled.  Pancreas: Visualized portions are within normal limits.  Right kidney: 10.0 cm. No hydronephrosis.  Ascites: None.  IVC and aorta: Visualized portions are within normal limits.    IMPRESSION:  Contracted stone filled gallbladder

## 2023-11-13 NOTE — ED PROVIDER NOTE - PROGRESS NOTE DETAILS
RODRIGO Wagner: Surgery consulted. RODRIGO Wagner: Patient evaluated by intake physician. HPI/ROS/PE as noted above. Will follow up plan per intake physician and continue to assess patient. RODRIGO Wagner: Evaluated by surgery. Given option to stay vs. discharge. Patient has child and cannot stay. Advised Augmentin x4 days 2/2 shift and follow up with clinic.

## 2023-11-13 NOTE — ED PROVIDER NOTE - PATIENT PORTAL LINK FT
You can access the FollowMyHealth Patient Portal offered by [facility] by registering at the following website: [email or web address]. By joining [employer]’s FollowMyHealth portal, you will also be able to view your health information using other applications (apps) compatible with our system.

## 2023-11-13 NOTE — CONSULT NOTE ADULT - NS ATTEND AMEND GEN_ALL_CORE FT
38F presents with abdominal pain.  Pain started in the RUQ and is non-radiating.  She endorses nausea/vomiting, and currently has no pain.  On exam, she is non-tender to palpation.  Her labs demonstrate no leukocytosis, but there is a left shift.  RUQ U/S shows no signs of cholecystitis.  She can be safely DC home with plan for outpatient follow-up.

## 2023-11-13 NOTE — ED PROVIDER NOTE - PHYSICAL EXAMINATION
Head: atraumatic, normocephalic  Face: atraumatic, no crepitus no orbital/maxillary/mandibular ttp  throat: uvula midline no exudates  eyes: perrla eomi  heart: rrr s1s2  lungs: ctab  abd: soft, nd +bs no rebound/guarding no cva ttp, + epigastric and RUQ tenderness   skin: warm  LE: no swelling, no calf ttp  back: no midline cervical/thoracic/lumbar ttp

## 2023-11-13 NOTE — ED ADULT TRIAGE NOTE - CHIEF COMPLAINT QUOTE
BIBA  from home c/o upper abd pain  started after eating breakfast . Recently diagnosed with gallstones . Nees to fallow up with surgeon. PT states  after she vomited her pain resolved

## 2023-11-13 NOTE — ED PROVIDER NOTE - ATTENDING APP SHARED VISIT CONTRIBUTION OF CARE
I, Bing Quintana, performed the initial face to face bedside interview with this patient regarding history of present illness, review of symptoms and relevant past medical, social and family history.  I completed an independent physical examination.  I was the initial provider who evaluated this patient. I have signed out the follow up of any pending tests (i.e. labs, radiological studies) to the ACP.  I have communicated the patient’s plan of care and disposition with the ACP.

## 2023-11-13 NOTE — ED ADULT NURSE NOTE - OBJECTIVE STATEMENT
Pt a&ox3, in ED for epigastric pain and nausea secondary to gallstones, not actively vomiting, however did multiple time yesterday, no SOB, unlabored breathing, equal rise & fall, able to speak in full sentences, denies CP. PMH of gallstones

## 2024-01-05 NOTE — OB RN DELIVERY SUMMARY - BABY A: APGAR 1 MIN HEART RATE, DELIVERY
DISPLAY PLAN FREE TEXT
(2) more than 100 beats/min

## 2024-08-01 NOTE — OB RN TRIAGE NOTE - NS_CONTRACTIONS_OBGYN_ALL_OB
Spoke with patient chest x-ray. Patient did not have questions or concerns at this time.     Moderate

## 2025-01-06 NOTE — OB RN PATIENT PROFILE - BREASTFEEDING IS BETTER ESTABLISHED WHEN INFANTS ARE ROOMING IN WITH PARENT (23/24 HOURS OF THE DAY)
[May participate in all age-appropriate activities] : [unfilled] May participate in all age-appropriate activities. [Needs SBE Prophylaxis] : [unfilled] does not need bacterial endocarditis prophylaxis [FreeTextEntry1] : Follow-up in 1 year or as needed Statement Selected